# Patient Record
Sex: MALE | Race: OTHER
[De-identification: names, ages, dates, MRNs, and addresses within clinical notes are randomized per-mention and may not be internally consistent; named-entity substitution may affect disease eponyms.]

---

## 2019-07-02 ENCOUNTER — HOSPITAL ENCOUNTER (EMERGENCY)
Dept: HOSPITAL 61 - ER | Age: 34
Discharge: HOME | End: 2019-07-02
Payer: SELF-PAY

## 2019-07-02 VITALS — WEIGHT: 136 LBS | HEIGHT: 60 IN | BODY MASS INDEX: 26.7 KG/M2

## 2019-07-02 VITALS — DIASTOLIC BLOOD PRESSURE: 61 MMHG | SYSTOLIC BLOOD PRESSURE: 127 MMHG

## 2019-07-02 DIAGNOSIS — T79.8XXA: ICD-10-CM

## 2019-07-02 DIAGNOSIS — T26.11XA: Primary | ICD-10-CM

## 2019-07-02 DIAGNOSIS — S05.01XA: ICD-10-CM
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
## 2019-07-02 PROCEDURE — 99283 EMERGENCY DEPT VISIT LOW MDM: CPT

## 2019-07-02 NOTE — PHYS DOC
Adult General


Chief Complaint


Chief Complaint:  EYE PROBLEMS





HPI


HPI





Patient is a 33  year old male who presents with burn to the right eye. He 

states the works at [employer] as a  he was cooking and a drop 

of oil got into his right eye at noon today. Denies any vision loss





Review of Systems


Review of Systems





Constitutional: Denies fever or chills []


Eyes: Reports burn to the right eye. Denies change in visual acuity


Integument: Denies rash or skin lesions []


Neurologic: Denies headache, focal weakness or sensory changes []








All other systems were reviewed and found to be within normal limits, except as 

documented in this note.





Current Medications


Current Medications





Current Medications








 Medications


  (Trade)  Dose


 Ordered  Sig/Yoli  Start Time


 Stop Time Status Last Admin


Dose Admin


 


 Fluorescein Sodium


  (Ful-Paris)  1 strip  STK-MED ONCE  7/2/19 22:14


 7/2/19 22:15 DC  





 


 Tetracaine HCl


  (Tetracaine)  40 drop  STK-MED ONCE  7/2/19 22:14


 7/2/19 22:15 DC  














Allergies


Allergies





Allergies








Coded Allergies Type Severity Reaction Last Updated Verified


 


  No Known Drug Allergies    7/2/19 No











Physical Exam


Physical Exam





Constitutional: Well developed, well nourished, no acute distress, non-toxic 

appearance. []


HENT: Normocephalic, atraumatic, bilateral external ears normal, oropharynx 

moist, no oral exudates, nose normal. []


Eyes: PERRLA, EOMI, mild injection to the right conjunctiva normal,  clear 

discharge. Internal chambers are intact


Eye exam under woods lump


Right eye was numbed with tetracaine and stained with fluorescein. Small corneal

 abrasion noted at 1600 position.


Skin: Warm, dry, no erythema, no rash. [] 


Back: No tenderness, no CVA tenderness. [] 


Extremities: No tenderness, no cyanosis, no clubbing, ROM intact, no edema. [] 


Neurologic: Alert and oriented X 3, normal motor function, normal sensory 

function, no focal deficits noted. []


Psychologic: Affect normal, judgement normal, mood normal. []





EKG


EKG


[]





Radiology/Procedures


Radiology/Procedures


[]





Course & Med Decision Making


Course & Med Decision Making


Pertinent Labs and Imaging studies reviewed. (See chart for details)





This is a 33-year-old male patient who presents to the ED today to be evaluated 

after getting hot cooking oil in his right eye while cooking. Tetanus up to 

date.


Noted for corneal abrasion/burn to the right eye. D/c on Erythromycin. F/u with 

ophthalmologist in 2-7 days.





Dragon Disclaimer


Winston Disclaimer


This electronic medical record was generated, in whole or in part, using a voice

 recognition dictation system.





Departure


Departure


Impression:  


   Primary Impression:  


   Burn of cornea, right


   Additional Impression:  


   Corneal abrasion, right


Disposition:  01 HOME, SELF-CARE


Condition:  STABLE


Referrals:  


URMILA TAYLOR MD


follow up in 2-7 days


Patient Instructions:  Eye - Corneal Abrasion, Easy-to-Read





Additional Instructions:  


You have a small burn to the right eye. Please contact the eye doctor provided 

in 2-7 days and follow up. Use the medicine provided as ordered.


Scripts


Erythromycin Base (Erythromycin) 1 Gm Oint...g.


1 APPLIC RIGHT EAR Q4HRS W/A, #1 MISC


   Use for 7 days


   Prov: LIZET SHIELDS         7/2/19





Problem Qualifiers








   Primary Impression:  


   Burn of cornea, right


   Encounter type:  initial encounter  Qualified Codes:  T26.11XA - Burn of 

   cornea and conjunctival sac, right eye, initial encounter


   Additional Impression:  


   Corneal abrasion, right


   Encounter type:  initial encounter  Qualified Codes:  S05.01XA - Injury of 

   conjunctiva and corneal abrasion without foreign body, right eye, initial 

   encounter








LIZET SHIELDS               Jul 2, 2019 22:27